# Patient Record
Sex: FEMALE | Race: WHITE
[De-identification: names, ages, dates, MRNs, and addresses within clinical notes are randomized per-mention and may not be internally consistent; named-entity substitution may affect disease eponyms.]

---

## 2018-10-11 ENCOUNTER — HOSPITAL ENCOUNTER (EMERGENCY)
Dept: HOSPITAL 74 - FER | Age: 23
Discharge: HOME | End: 2018-10-11
Payer: COMMERCIAL

## 2018-10-11 VITALS — HEART RATE: 110 BPM | DIASTOLIC BLOOD PRESSURE: 76 MMHG | TEMPERATURE: 98.5 F | SYSTOLIC BLOOD PRESSURE: 119 MMHG

## 2018-10-11 VITALS — BODY MASS INDEX: 17.9 KG/M2

## 2018-10-11 DIAGNOSIS — K58.9: ICD-10-CM

## 2018-10-11 DIAGNOSIS — R19.7: Primary | ICD-10-CM

## 2018-10-11 LAB
ALBUMIN SERPL-MCNC: 4 G/DL (ref 3.5–5)
ALP SERPL-CCNC: 32 U/L (ref 32–92)
ALT SERPL-CCNC: 12 U/L (ref 10–40)
ANION GAP SERPL CALC-SCNC: 8 MMOL/L (ref 8–16)
AST SERPL-CCNC: 20 U/L (ref 10–42)
BACTERIA #/AREA URNS HPF: (no result) /HPF
BASOPHILS # BLD: 0.2 % (ref 0–2)
BILIRUB SERPL-MCNC: 0.9 MG/DL (ref 0.2–1)
BUN SERPL-MCNC: 5 MG/DL (ref 7–18)
CALCIUM SERPL-MCNC: 8.7 MG/DL (ref 8.4–10.2)
CHLORIDE SERPL-SCNC: 100 MMOL/L (ref 98–107)
CO2 SERPL-SCNC: 23 MMOL/L (ref 22–28)
CREAT SERPL-MCNC: 0.6 MG/DL (ref 0.6–1.3)
DEPRECATED RDW RBC AUTO: 12.4 % (ref 11.6–15.6)
EOSINOPHIL # BLD: 0 % (ref 0–4.5)
EPITH CASTS URNS QL MICRO: (no result) /HPF
GLUCOSE SERPL-MCNC: 97 MG/DL (ref 74–106)
HCT VFR BLD CALC: 37.1 % (ref 32.4–45.2)
HGB BLD-MCNC: 12.9 GM/DL (ref 10.7–15.3)
KETONES UR QL STRIP: (no result)
LEUKOCYTE ESTERASE UR QL STRIP.AUTO: (no result)
LYMPHOCYTES # BLD: 8 % (ref 8–40)
MCH RBC QN AUTO: 29.3 PG (ref 25.7–33.7)
MCHC RBC AUTO-ENTMCNC: 34.7 G/DL (ref 32–36)
MCV RBC: 84.4 FL (ref 80–96)
MONOCYTES # BLD AUTO: 4.9 % (ref 3.8–10.2)
NEUTROPHILS # BLD: 86.9 % (ref 42.8–82.8)
PH UR: 6 [PH] (ref 4.5–8)
PLATELET # BLD AUTO: 240 K/MM3 (ref 134–434)
PMV BLD: 8.6 FL (ref 7.5–11.1)
POTASSIUM SERPLBLD-SCNC: 3.4 MMOL/L (ref 3.5–5.1)
PROT SERPL-MCNC: 7.4 G/DL (ref 6.4–8.3)
RBC # BLD AUTO: (no result) /HPF (ref 0–3)
RBC # BLD AUTO: 4.39 M/MM3 (ref 3.6–5.2)
SODIUM SERPL-SCNC: 131 MMOL/L (ref 136–145)
SP GR UR: <= 1.005 (ref 1.01–1.03)
UROBILINOGEN UR STRIP-MCNC: 0.2 MG/DL (ref 0.2–1)
WBC # BLD AUTO: 10.7 K/MM3 (ref 4–10.8)

## 2018-10-11 PROCEDURE — 3E033GC INTRODUCTION OF OTHER THERAPEUTIC SUBSTANCE INTO PERIPHERAL VEIN, PERCUTANEOUS APPROACH: ICD-10-PCS

## 2018-10-11 NOTE — PDOC
History of Present Illness





- General


History Source: Patient, Family


Exam Limitations: No Limitations





- History of Present Illness


Initial Comments: 





10/11/18 22:26





The patient is a 23 year old female, with a significant past medical history of 

IBS, who presents to the ED complaining of abdominal pain, fever and diarrhea 

for the past 2 days. She describes her diarrhea as orange colored but when she 

was here in the ED she noticed some blood in the stool. She went to an urgent 

care today who advised her to come to the ED for further evaluation. She 

reports a total of 10 bowel movements. She notes that her fever has been as 

high as 103 F. She reports that she had reheated Chinese food 2 days ago prior 

to her symptoms starting. She notes that her family ate the chinese food the 

day prior with no symptoms. She denies any recent travel other than a trip to 

[place] 1 month ago. 





The patient denies chest pain, shortness of breath, headache and dizziness. 

Denies chills, vomiting, or constipation. Denies dysuria, frequency, urgency 

and hematuria. 





Allergies: none


Past surgical history: none reported 


Social History: No alcohol, tobacco or drug use reported 








<Julián Negrete - Last Filed: 10/11/18 23:26>





<Laura Oliva - Last Filed: 10/12/18 04:43>





- General


Chief Complaint: Diarrhea


Stated Complaint: FEVER/DIARRHEA


Time Seen by Provider: 10/11/18 20:12





Past History





<Julián Negrete - Last Filed: 10/11/18 23:26>





- Past Medical History


COPD: No


GI Disorders: Yes (IBS)


Other medical history: CHRONIC BACK/LEG PAIN





- Suicide/Smoking/Psychosocial Hx


Smoking History: Never smoked





<Laura Oliva - Last Filed: 10/12/18 04:43>





- Past Medical History


Allergies/Adverse Reactions: 


 Allergies











Allergy/AdvReac Type Severity Reaction Status Date / Time


 


No Known Allergies Allergy   Unverified 10/11/18 19:59











Home Medications: 


Ambulatory Orders





Azithromycin [Zithromax Tri-Moose (3 DAYS) -] 500 mg PO DAILY #3 tablet 10/11/18 


Ibuprofen [Advil -] 400 mg PO ONCE 10/11/18 











**Review of Systems





- Review of Systems


Able to Perform ROS?: Yes


Comments:: 





10/11/18 22:26





GENERAL/CONSTITUTIONAL: (+) Fever. No chills. No weakness.


HEAD, EYES, EARS, NOSE AND THROAT: No change in vision. No ear pain or 

discharge. No sore throat.


GASTROINTESTINAL: (+) Abdominal pain and diarrhea. No nausea, vomiting, or 

constipation.


GENITOURINARY: No dysuria, frequency, or change in urination.


CARDIOVASCULAR: No chest pain or shortness of breath.


RESPIRATORY: No cough, wheezing, or hemoptysis.


MUSCULOSKELETAL: No joint or muscle swelling or pain. No neck or back pain.


SKIN: No rash


NEUROLOGIC: No headache, vertigo, loss of consciousness, or change in strength/

sensation.


ENDOCRINE: No increased thirst. No abnormal weight change.


HEMATOLOGIC/LYMPHATIC: No anemia, easy bleeding, or history of blood clots.


ALLERGIC/IMMUNOLOGIC: No hives or skin allergy.








<Julián Negrete - Last Filed: 10/11/18 23:26>





*Physical Exam





- Vital Signs


 Last Vital Signs











Temp Pulse Resp BP Pulse Ox


 


 98.5 F   110 H  16   119/76   100 


 


 10/11/18 21:43  10/11/18 21:43  10/11/18 21:43  10/11/18 21:43  10/11/18 21:43














- Physical Exam


Comments: 





10/11/18 22:26


Constitutional: Awake, alert, oriented.  No acute distress.


Head:  Normocephalic.  Atraumatic


Eyes:  PERRL. EOMI.  Conjunctivae are not pale.


ENT:  (+) Dry Mucous Membrane . Posterior pharynx without exudates or erythema. 

Uvula midline.


Neck:  Supple.  Full ROM. No lymphadenopathy.


Cardiovascular:  Regular rate.  Regular rhythm. S1, S2 regular.  Distal pulses 

are 2+ and symmetric.  


Pulmonary/Chest:  No evidence of respiratory distress.  Clear to auscultation 

bilaterally  No wheezing, rales or rhonchi.


Abdominal:  (+) Left lower quadrant tenderness. Soft and non-distended.  No 

rebound, guarding or rigidity.  No organomegaly. No palpable masses. Good bowel 

sounds.


Back:  No CVA tenderness.


Musculoskeletal:  No edema.  No cyanosis.  No clubbing.  Full range of motion 

in all extremities.  Nocalf tenderness. Radial/pedal pulses are intact and 2+ 

bilaterally


Skin:  Skin is warm and dry.  No petechiae.  No purpura.  


Neurological:  Alert and oriented to person, place, and time.  Cranial nerves II

-XII are grossly intact. Normal speech. Strength is grossly symmetric. No 

sensory deficits.


Psychiatric:  Good eye contact.  Normal interaction, affect and behavior.








<Julián Negrete - Last Filed: 10/11/18 23:26>





- Vital Signs


 Last Vital Signs











Temp Pulse Resp BP Pulse Ox


 


 98.5 F   110 H  16   119/76   100 


 


 10/11/18 21:43  10/11/18 21:43  10/11/18 21:43  10/11/18 21:43  10/11/18 21:43














<PjKenishaLaura J - Last Filed: 10/12/18 04:43>





ED Treatment Course





- LABORATORY


CBC & Chemistry Diagram: 


 10/11/18 20:39





 10/11/18 20:39





- ADDITIONAL ORDERS


Additional order review: 


 Laboratory  Results











  10/11/18 10/11/18





  21:17 20:39


 


Sodium   131 L


 


Potassium   3.4 L


 


Chloride   100


 


Carbon Dioxide   23


 


Anion Gap   8


 


BUN   5 L


 


Creatinine   0.6


 


Creat Clearance w eGFR   > 60


 


Random Glucose   97


 


Calcium   8.7


 


Total Bilirubin   0.9


 


AST   20


 


ALT   12


 


Alkaline Phosphatase   32


 


Total Protein   7.4


 


Albumin   4.0


 


Urine Color  Yellow 


 


Urine Appearance  Clear 


 


Urine pH  6.0 


 


Ur Specific Gravity  <= 1.005 L 


 


Urine Protein  Negative 


 


Urine Glucose (UA)  Negative 


 


Urine Ketones  1+ H 


 


Urine Blood  Trace-intact H 


 


Urine Nitrite  Negative 


 


Urine Bilirubin  Negative 


 


Urine Urobilinogen  0.2 


 


Ur Leukocyte Esterase  3+ H 


 


Urine RBC  2-5 


 


Urine WBC  10-20 


 


Ur Epithelial Cells  Few 


 


Urine Bacteria  3+ 


 


Urine HCG, Qual  Negative 








 











  10/11/18





  20:39


 


RBC  4.39


 


MCV  84.4


 


MCHC  34.7


 


RDW  12.4


 


MPV  8.6


 


Neutrophils %  86.9 H


 


Lymphocytes %  8.0


 


Monocytes %  4.9


 


Eosinophils %  0.0


 


Basophils %  0.2














<Julián Negrete - Last Filed: 10/11/18 23:26>





- LABORATORY


CBC & Chemistry Diagram: 


 10/11/18 20:39





 10/11/18 20:39





- ADDITIONAL ORDERS


Additional order review: 


 Laboratory  Results











  10/11/18 10/11/18





  21:17 20:39


 


Sodium   131 L


 


Potassium   3.4 L


 


Chloride   100


 


Carbon Dioxide   23


 


Anion Gap   8


 


BUN   5 L


 


Creatinine   0.6


 


Creat Clearance w eGFR   > 60


 


Random Glucose   97


 


Calcium   8.7


 


Total Bilirubin   0.9


 


AST   20


 


ALT   12


 


Alkaline Phosphatase   32


 


Total Protein   7.4


 


Albumin   4.0


 


Urine Color  Yellow 


 


Urine Appearance  Clear 


 


Urine pH  6.0 


 


Ur Specific Gravity  <= 1.005 L 


 


Urine Protein  Negative 


 


Urine Glucose (UA)  Negative 


 


Urine Ketones  1+ H 


 


Urine Blood  Trace-intact H 


 


Urine Nitrite  Negative 


 


Urine Bilirubin  Negative 


 


Urine Urobilinogen  0.2 


 


Ur Leukocyte Esterase  3+ H 


 


Urine RBC  2-5 


 


Urine WBC  10-20 


 


Ur Epithelial Cells  Few 


 


Urine Bacteria  3+ 


 


Urine HCG, Qual  Negative 








 











  10/11/18





  20:39


 


RBC  4.39


 


MCV  84.4


 


MCHC  34.7


 


RDW  12.4


 


MPV  8.6


 


Neutrophils %  86.9 H


 


Lymphocytes %  8.0


 


Monocytes %  4.9


 


Eosinophils %  0.0


 


Basophils %  0.2














<Laura Oliva - Last Filed: 10/12/18 04:43>





Progress Note





- Progress Note


Progress Note: 





Documentation has been prepared under my direction and personally reviewed by 

me in its entirety. I attest that this documented accurately reflects all work, 

treatment, procedures and medical decision making performed by me.





<Laura Oliva - Last Filed: 10/12/18 04:43>





Medical Decision Making





- Medical Decision Making





As noted above, this 23-year-old woman with a history of irritable bowel 

syndrome but no other significant abnormalities presents with a few day history 

of diarrhea without nausea/vomiting.  This occurred after eating reheated 

Chinese food.  No recent travel or exposure to other people with diarrhea.  Of 

note, over the last few hours, patient has developed maria blood in her 

diarrhea.  Exam as noted.


Patient received normal saline IV hydration, with CBC/chemistry profile/UA/PGU 

obtained.  Results not significantly abnormal except for potassium of 3.4(10 

mEq KCl IV replacement).  Renal function as well as remainder of the 

electrolytes are essentially normal.  CBC showed a mild increase in neutrophils 

but a normal WBC count.  Urinalysis was consistent with UTI (although patient 

denied any symptoms of UTI) with 3+ bacteria/few epi/3+ LE/10-20 WBC.  Urine C&

S sent.





Because of the patient's maria blood in the stool over the last day, patient 

will be treated with azithromycin 500 mg today and for the next 2 days, first 

dose given here in the ER.


Patient should rest, drink plenty of fluids and continue light diet.  She 

should return to the emergency room if she has worsening pain, develops high 

fever or vomiting.  She should plan to follow up with her general doctor (or 

gastroenterologist) within the next few days.








<Laura Oliva - Last Filed: 10/12/18 04:43>





*DC/Admit/Observation/Transfer





- Attestations


Scribe Attestion: 





10/11/18 22:26





Documentation prepared by Julián Negrete, acting as medical scribe for Laura Oliva MD





<Julián Negrete - Last Filed: 10/11/18 23:26>





<Laura Olvia - Last Filed: 10/12/18 04:43>


Diagnosis at time of Disposition: 


Diarrhea


Qualifiers:


 Diarrhea type: unspecified type Qualified Code(s): R19.7 - Diarrhea, 

unspecified








- Discharge Dispostion


Disposition: HOME


Condition at time of disposition: Stable





- Prescriptions


Prescriptions: 


Azithromycin [Zithromax Tri-Moose (3 DAYS) -] 500 mg PO DAILY #3 tablet





- Referrals


Referrals: 


Jensen Genao MD [Primary Care Provider] - 





- Patient Instructions


Printed Discharge Instructions:  Diarrhea


Additional Instructions: 


rest 


drink plenty of fluids


Azirhromycin 500mg daily for the next 3 days


 no school until Wednesday, Oct 17th


followup with your doctor if pain/bloody stool persists


return to ER if symptoms are severe





- Post Discharge Activity


Forms/Work/School Notes:  Back to School

## 2019-11-25 ENCOUNTER — RECORD ABSTRACTING (OUTPATIENT)
Age: 24
End: 2019-11-25

## 2019-11-25 DIAGNOSIS — M54.42 LUMBAGO WITH SCIATICA, LEFT SIDE: ICD-10-CM

## 2019-11-25 DIAGNOSIS — L70.9 ACNE, UNSPECIFIED: ICD-10-CM

## 2019-12-13 ENCOUNTER — APPOINTMENT (OUTPATIENT)
Dept: INTERNAL MEDICINE | Facility: CLINIC | Age: 24
End: 2019-12-13
Payer: COMMERCIAL

## 2019-12-13 VITALS — TEMPERATURE: 98 F | BODY MASS INDEX: 17.75 KG/M2 | HEIGHT: 61 IN | WEIGHT: 94 LBS

## 2019-12-13 VITALS — DIASTOLIC BLOOD PRESSURE: 70 MMHG | SYSTOLIC BLOOD PRESSURE: 120 MMHG

## 2019-12-13 DIAGNOSIS — Z23 ENCOUNTER FOR IMMUNIZATION: ICD-10-CM

## 2019-12-13 DIAGNOSIS — Z72.3 LACK OF PHYSICAL EXERCISE: ICD-10-CM

## 2019-12-13 DIAGNOSIS — Z82.49 FAMILY HISTORY OF ISCHEMIC HEART DISEASE AND OTHER DISEASES OF THE CIRCULATORY SYSTEM: ICD-10-CM

## 2019-12-13 DIAGNOSIS — Z00.00 ENCOUNTER FOR GENERAL ADULT MEDICAL EXAMINATION W/OUT ABNORMAL FINDINGS: ICD-10-CM

## 2019-12-13 PROCEDURE — 36415 COLL VENOUS BLD VENIPUNCTURE: CPT

## 2019-12-13 PROCEDURE — G0008: CPT

## 2019-12-13 PROCEDURE — 90686 IIV4 VACC NO PRSV 0.5 ML IM: CPT

## 2019-12-13 PROCEDURE — 99395 PREV VISIT EST AGE 18-39: CPT | Mod: 25

## 2019-12-13 RX ORDER — IBUPROFEN 200 MG/1
200 TABLET, COATED ORAL
Refills: 0 | Status: COMPLETED | COMMUNITY
End: 2019-12-13

## 2019-12-13 RX ORDER — NAPROXEN 500 MG/1
500 TABLET ORAL
Refills: 0 | Status: COMPLETED | COMMUNITY
End: 2019-12-13

## 2019-12-13 RX ORDER — CYCLOBENZAPRINE HYDROCHLORIDE 10 MG/1
10 TABLET, FILM COATED ORAL
Refills: 0 | Status: COMPLETED | COMMUNITY
End: 2019-12-13

## 2019-12-13 NOTE — HISTORY OF PRESENT ILLNESS
[FreeTextEntry1] : physical [de-identified] : Patient is a 24F with a hx of lumbar disc herniation, IBS-D presents today for annual physical. \par Feeling well overall. 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 Will be graduating in May, working part time. \par Was diagnosed with IBS-D ([id number] during college). No really a full workup (no labs or stool testing. Has had 2 episodes of bloody diarrhea in the last few years. Was following with Dr. Tai Bennett of [facility] who did colonoscopy in 2015 which was normal. In October 2018, went to hospital with bloody diarrhea (was given 2 antibiotics) and since then sympromts have been improved but not gone. \par Not exercising due to lumbar radiculopathy

## 2019-12-13 NOTE — HEALTH RISK ASSESSMENT
[Yes] : Yes [2 - 4 times a month (2 pts)] : 2-4 times a month (2 points) [1 or 2 (0 pts)] : 1 or 2 (0 points) [Never (0 pts)] : Never (0 points) [No] : In the past 12 months have you used drugs other than those required for medical reasons? No [No falls in past year] : Patient reported no falls in the past year [0] : 2) Feeling down, depressed, or hopeless: Not at all (0) [Patient reported colonoscopy was normal] : Patient reported colonoscopy was normal [HIV Test offered] : HIV Test offered [Hepatitis C test offered] : Hepatitis C test offered [With Family] : lives with family [# of Members in Household ___] :  household currently consist of [unfilled] member(s) [Employed] : employed [Graduate School] : graduate school [Student] : student [# Of Children ___] : has [unfilled] children [Single] : single [Feels Safe at Home] : Feels safe at home [Reports normal functional visual acuity (ie: able to read med bottle)] : Reports normal functional visual acuity [Reports changes in dental health] : Reports changes in dental health [Smoke Detector] : smoke detector [Carbon Monoxide Detector] : carbon monoxide detector [Sunscreen] : uses sunscreen [Seat Belt] :  uses seat belt [Patient/Caregiver not ready to engage] : Patient/Caregiver not ready to engage [] : No [de-identified] : socially  [Audit-CScore] : 2 [de-identified] : No, Pt. has a back condition.  [de-identified] : Well balanced  [OTN0Rzulp] : 0 [Sexually Active] : not sexually active [Reports changes in hearing] : Reports no changes in hearing [Reports changes in vision] : Reports no changes in vision [Guns at Home] : no guns at home [Caregiver Concerns] : does not have caregiver concerns [Travel to Developing Areas] : does not  travel to developing areas [Safety elements used in home] : no safety elements used in home [ColonoscopyDate] : [id number] [de-identified] : Full-time grad student  [FreeTextEntry2] : [employer]  [de-identified] : last exam 1/2019 [de-identified] : poor gums, last exam 9/2019 [AdvancecareDate] : 12/2019

## 2019-12-13 NOTE — PHYSICAL EXAM
[No Acute Distress] : no acute distress [PERRL] : pupils equal round and reactive to light [Normal Sclera/Conjunctiva] : normal sclera/conjunctiva [EOMI] : extraocular movements intact [Normal Outer Ear/Nose] : the outer ears and nose were normal in appearance [Normal Oropharynx] : the oropharynx was normal [Supple] : supple [No Lymphadenopathy] : no lymphadenopathy [No Respiratory Distress] : no respiratory distress  [Thyroid Normal, No Nodules] : the thyroid was normal and there were no nodules present [Clear to Auscultation] : lungs were clear to auscultation bilaterally [No Accessory Muscle Use] : no accessory muscle use [Normal Rate] : normal rate  [Regular Rhythm] : with a regular rhythm [No Murmur] : no murmur heard [Normal S1, S2] : normal S1 and S2 [No Varicosities] : no varicosities [No Palpable Aorta] : no palpable aorta [Pedal Pulses Present] : the pedal pulses are present [No Edema] : there was no peripheral edema [No Extremity Clubbing/Cyanosis] : no extremity clubbing/cyanosis [Non Tender] : non-tender [Soft] : abdomen soft [No HSM] : no HSM [No Masses] : no abdominal mass palpated [Non-distended] : non-distended [Normal Bowel Sounds] : normal bowel sounds [Normal Posterior Cervical Nodes] : no posterior cervical lymphadenopathy [Normal Anterior Cervical Nodes] : no anterior cervical lymphadenopathy [No Joint Swelling] : no joint swelling [Grossly Normal Strength/Tone] : grossly normal strength/tone [No Rash] : no rash [Coordination Grossly Intact] : coordination grossly intact [Normal Gait] : normal gait [No Focal Deficits] : no focal deficits [Normal Insight/Judgement] : insight and judgment were intact [Normal Affect] : the affect was normal

## 2019-12-19 LAB
25(OH)D3 SERPL-MCNC: 19.1 NG/ML
ALBUMIN SERPL ELPH-MCNC: 4.8 G/DL
ALP BLD-CCNC: 43 U/L
ALT SERPL-CCNC: 10 U/L
ANION GAP SERPL CALC-SCNC: 12 MMOL/L
AST SERPL-CCNC: 16 U/L
BASOPHILS # BLD AUTO: 0.04 K/UL
BASOPHILS NFR BLD AUTO: 0.7 %
BILIRUB SERPL-MCNC: 1 MG/DL
BUN SERPL-MCNC: 10 MG/DL
CALCIUM SERPL-MCNC: 9.7 MG/DL
CHLORIDE SERPL-SCNC: 103 MMOL/L
CHOLEST SERPL-MCNC: 152 MG/DL
CHOLEST/HDLC SERPL: 2 RATIO
CO2 SERPL-SCNC: 24 MMOL/L
CREAT SERPL-MCNC: 0.53 MG/DL
EOSINOPHIL # BLD AUTO: 0.1 K/UL
EOSINOPHIL NFR BLD AUTO: 1.7 %
GLUCOSE SERPL-MCNC: 83 MG/DL
HCT VFR BLD CALC: 38.2 %
HCV AB SER QL: NONREACTIVE
HCV S/CO RATIO: 0.12 S/CO
HDLC SERPL-MCNC: 75 MG/DL
HGB BLD-MCNC: 12.2 G/DL
HIV1+2 AB SPEC QL IA.RAPID: NONREACTIVE
IMM GRANULOCYTES NFR BLD AUTO: 0.3 %
LDLC SERPL CALC-MCNC: 69 MG/DL
LYMPHOCYTES # BLD AUTO: 1.95 K/UL
LYMPHOCYTES NFR BLD AUTO: 32.6 %
MAN DIFF?: NORMAL
MCHC RBC-ENTMCNC: 28.7 PG
MCHC RBC-ENTMCNC: 31.9 GM/DL
MCV RBC AUTO: 89.9 FL
MONOCYTES # BLD AUTO: 0.42 K/UL
MONOCYTES NFR BLD AUTO: 7 %
NEUTROPHILS # BLD AUTO: 3.45 K/UL
NEUTROPHILS NFR BLD AUTO: 57.7 %
PLATELET # BLD AUTO: 321 K/UL
POTASSIUM SERPL-SCNC: 3.8 MMOL/L
PROT SERPL-MCNC: 7.5 G/DL
RBC # BLD: 4.25 M/UL
RBC # FLD: 13.1 %
SODIUM SERPL-SCNC: 139 MMOL/L
TRIGL SERPL-MCNC: 38 MG/DL
TSH SERPL-ACNC: 1.78 UIU/ML
VIT B12 SERPL-MCNC: 433 PG/ML
WBC # FLD AUTO: 5.98 K/UL

## 2020-06-10 ENCOUNTER — APPOINTMENT (OUTPATIENT)
Dept: INTERNAL MEDICINE | Facility: CLINIC | Age: 25
End: 2020-06-10
Payer: COMMERCIAL

## 2020-06-10 VITALS
HEIGHT: 61 IN | BODY MASS INDEX: 18.5 KG/M2 | WEIGHT: 98 LBS | DIASTOLIC BLOOD PRESSURE: 60 MMHG | SYSTOLIC BLOOD PRESSURE: 104 MMHG | OXYGEN SATURATION: 98 % | HEART RATE: 72 BPM

## 2020-06-10 DIAGNOSIS — Z20.828 CONTACT WITH AND (SUSPECTED) EXPOSURE TO OTHER VIRAL COMMUNICABLE DISEASES: ICD-10-CM

## 2020-06-10 DIAGNOSIS — Z23 ENCOUNTER FOR IMMUNIZATION: ICD-10-CM

## 2020-06-10 DIAGNOSIS — Z11.1 ENCOUNTER FOR SCREENING FOR RESPIRATORY TUBERCULOSIS: ICD-10-CM

## 2020-06-10 PROCEDURE — 90715 TDAP VACCINE 7 YRS/> IM: CPT

## 2020-06-10 PROCEDURE — G0442 ANNUAL ALCOHOL SCREEN 15 MIN: CPT | Mod: 59

## 2020-06-10 PROCEDURE — 90471 IMMUNIZATION ADMIN: CPT

## 2020-06-10 PROCEDURE — 86580 TB INTRADERMAL TEST: CPT

## 2020-06-10 PROCEDURE — 99214 OFFICE O/P EST MOD 30 MIN: CPT | Mod: 25

## 2020-06-10 PROCEDURE — 36415 COLL VENOUS BLD VENIPUNCTURE: CPT

## 2020-06-11 PROBLEM — Z20.828 EXPOSURE TO COVID-19 VIRUS: Status: ACTIVE | Noted: 2020-06-10

## 2020-06-11 PROBLEM — Z23 NEED FOR MMR VACCINE: Status: ACTIVE | Noted: 2020-06-10

## 2020-06-12 ENCOUNTER — APPOINTMENT (OUTPATIENT)
Dept: INTERNAL MEDICINE | Facility: CLINIC | Age: 25
End: 2020-06-12
Payer: COMMERCIAL

## 2020-06-12 PROCEDURE — 90716 VAR VACCINE LIVE SUBQ: CPT

## 2020-06-12 PROCEDURE — 90471 IMMUNIZATION ADMIN: CPT

## 2020-06-17 LAB
HBV SURFACE AB SER QL: NONREACTIVE
HBV SURFACE AG SER QL: NONREACTIVE
MEV IGG FLD QL IA: 77.2 AU/ML
MEV IGG+IGM SER-IMP: POSITIVE
MUV AB SER-ACNC: POSITIVE
MUV IGG SER QL IA: 67.3 AU/ML
RUBV IGG FLD-ACNC: 1.4 INDEX
RUBV IGG SER-IMP: POSITIVE
SARS-COV-2 IGG SERPL IA-ACNC: <3.8 AU/ML
SARS-COV-2 IGG SERPL QL IA: NEGATIVE
VZV AB TITR SER: NEGATIVE
VZV IGG SER IF-ACNC: 132.3 INDEX

## 2020-07-01 ENCOUNTER — APPOINTMENT (OUTPATIENT)
Dept: INTERNAL MEDICINE | Facility: CLINIC | Age: 25
End: 2020-07-01
Payer: COMMERCIAL

## 2020-07-01 VITALS
SYSTOLIC BLOOD PRESSURE: 100 MMHG | WEIGHT: 98 LBS | HEART RATE: 74 BPM | BODY MASS INDEX: 18.5 KG/M2 | OXYGEN SATURATION: 98 % | HEIGHT: 61 IN | DIASTOLIC BLOOD PRESSURE: 60 MMHG

## 2020-07-01 DIAGNOSIS — Z20.820 CONTACT WITH AND (SUSPECTED) EXPOSURE TO VARICELLA: ICD-10-CM

## 2020-07-01 DIAGNOSIS — Z11.59 ENCOUNTER FOR SCREENING FOR OTHER VIRAL DISEASES: ICD-10-CM

## 2020-07-01 PROCEDURE — 99213 OFFICE O/P EST LOW 20 MIN: CPT

## 2020-07-02 PROBLEM — Z20.820 VARICELLA CONTACT: Status: ACTIVE | Noted: 2020-06-10

## 2020-07-02 PROBLEM — Z11.59 NEED FOR HEPATITIS B SCREENING TEST: Status: ACTIVE | Noted: 2020-06-10

## 2020-07-02 NOTE — HISTORY OF PRESENT ILLNESS
[de-identified] : Patient needs varicela titers and a Hep B vaccine [FreeTextEntry1] : varceilla titers\par \par hep b

## 2020-08-11 LAB
VZV AB TITR SER: POSITIVE
VZV IGG SER IF-ACNC: 1412 INDEX

## 2020-12-23 PROBLEM — Z11.1 ENCOUNTER FOR PPD SKIN TEST READING: Status: RESOLVED | Noted: 2020-06-10 | Resolved: 2020-12-23

## 2021-04-02 ENCOUNTER — TRANSCRIPTION ENCOUNTER (OUTPATIENT)
Age: 26
End: 2021-04-02

## 2021-05-24 ENCOUNTER — NON-APPOINTMENT (OUTPATIENT)
Age: 26
End: 2021-05-24

## 2021-05-26 ENCOUNTER — APPOINTMENT (OUTPATIENT)
Dept: OBGYN | Facility: CLINIC | Age: 26
End: 2021-05-26
Payer: COMMERCIAL

## 2021-05-26 VITALS
HEIGHT: 61 IN | BODY MASS INDEX: 17.94 KG/M2 | DIASTOLIC BLOOD PRESSURE: 60 MMHG | WEIGHT: 95 LBS | SYSTOLIC BLOOD PRESSURE: 100 MMHG

## 2021-05-26 DIAGNOSIS — Z87.19 PERSONAL HISTORY OF OTHER DISEASES OF THE DIGESTIVE SYSTEM: ICD-10-CM

## 2021-05-26 DIAGNOSIS — Z11.3 ENCOUNTER FOR SCREENING FOR INFECTIONS WITH A PREDOMINANTLY SEXUAL MODE OF TRANSMISSION: ICD-10-CM

## 2021-05-26 DIAGNOSIS — Z01.419 ENCOUNTER FOR GYNECOLOGICAL EXAMINATION (GENERAL) (ROUTINE) W/OUT ABNORMAL FINDINGS: ICD-10-CM

## 2021-05-26 PROCEDURE — 99385 PREV VISIT NEW AGE 18-39: CPT

## 2021-05-26 PROCEDURE — 99072 ADDL SUPL MATRL&STAF TM PHE: CPT

## 2021-05-27 LAB
HBV SURFACE AG SER QL: NONREACTIVE
HCV AB SER QL: NONREACTIVE
HCV S/CO RATIO: 0.14 S/CO
HIV1+2 AB SPEC QL IA.RAPID: NONREACTIVE
T PALLIDUM AB SER QL IA: NEGATIVE

## 2021-11-12 NOTE — PHYSICAL EXAM
[No Acute Distress] : no acute distress [Well Nourished] : well nourished [Well Developed] : well developed [Well-Appearing] : well-appearing [EOMI] : extraocular movements intact [No JVD] : no jugular venous distention [Thyroid Normal, No Nodules] : the thyroid was normal and there were no nodules present [Normal Rate] : normal rate  [No Edema] : there was no peripheral edema [Soft] : abdomen soft [Non Tender] : non-tender [No Masses] : no abdominal mass palpated [No HSM] : no HSM [Non-distended] : non-distended [Normal Bowel Sounds] : normal bowel sounds [Normal Posterior Cervical Nodes] : no posterior cervical lymphadenopathy [Normal Anterior Cervical Nodes] : no anterior cervical lymphadenopathy [No CVA Tenderness] : no CVA  tenderness [No Spinal Tenderness] : no spinal tenderness [No Rash] : no rash [Normal Gait] : normal gait [Deep Tendon Reflexes (DTR)] : deep tendon reflexes were 2+ and symmetric [No Focal Deficits] : no focal deficits [Normal Affect] : the affect was normal [Normal Insight/Judgement] : insight and judgment were intact No

## 2023-01-17 ENCOUNTER — APPOINTMENT (OUTPATIENT)
Dept: INTERNAL MEDICINE | Facility: CLINIC | Age: 28
End: 2023-01-17
Payer: COMMERCIAL

## 2023-01-17 ENCOUNTER — LABORATORY RESULT (OUTPATIENT)
Age: 28
End: 2023-01-17

## 2023-01-17 VITALS
SYSTOLIC BLOOD PRESSURE: 110 MMHG | HEIGHT: 61 IN | WEIGHT: 98 LBS | BODY MASS INDEX: 18.5 KG/M2 | HEART RATE: 81 BPM | OXYGEN SATURATION: 95 % | DIASTOLIC BLOOD PRESSURE: 80 MMHG

## 2023-01-17 DIAGNOSIS — N94.10 UNSPECIFIED DYSPAREUNIA: ICD-10-CM

## 2023-01-17 DIAGNOSIS — Z72.89 OTHER PROBLEMS RELATED TO LIFESTYLE: ICD-10-CM

## 2023-01-17 DIAGNOSIS — K62.5 HEMORRHAGE OF ANUS AND RECTUM: ICD-10-CM

## 2023-01-17 DIAGNOSIS — Z80.3 FAMILY HISTORY OF MALIGNANT NEOPLASM OF BREAST: ICD-10-CM

## 2023-01-17 DIAGNOSIS — Z00.00 ENCOUNTER FOR GENERAL ADULT MEDICAL EXAMINATION W/OUT ABNORMAL FINDINGS: ICD-10-CM

## 2023-01-17 PROCEDURE — 99213 OFFICE O/P EST LOW 20 MIN: CPT | Mod: 25

## 2023-01-17 PROCEDURE — 99395 PREV VISIT EST AGE 18-39: CPT | Mod: 25

## 2023-01-17 NOTE — REVIEW OF SYSTEMS
[Fever] : no fever [Recent Change In Weight] : ~T no recent weight change [Nasal Discharge] : no nasal discharge [Chest Pain] : no chest pain [Palpitations] : no palpitations [Shortness Of Breath] : no shortness of breath [Wheezing] : no wheezing [Cough] : no cough [Abdominal Pain] : no abdominal pain [Nausea] : no nausea [Constipation] : no constipation [Diarrhea] : diarrhea [Vomiting] : no vomiting [Heartburn] : no heartburn [Melena] : no melena [Dysuria] : no dysuria [Hematuria] : no hematuria [Frequency] : no frequency [FreeTextEntry7] : Normal blood in the stool recently

## 2023-01-17 NOTE — HEALTH RISK ASSESSMENT
[Never] : Never [Yes] : Yes [Monthly or less (1 pt)] : Monthly or less (1 point) [1 or 2 (0 pts)] : 1 or 2 (0 points) [Never (0 pts)] : Never (0 points) [0] : 2) Feeling down, depressed, or hopeless: Not at all (0) [PHQ-2 Negative - No further assessment needed] : PHQ-2 Negative - No further assessment needed [PHQ-9 Negative - No further assessment needed] : PHQ-9 Negative - No further assessment needed [Audit-CScore] : 1 [[id number]] : 0 [ColonoscopyComments] : patient stated shes getting a colonoscopy on 1/20/2023

## 2023-01-17 NOTE — HISTORY OF PRESENT ILLNESS
[FreeTextEntry1] : Here for annual PE. \par Went to ED for painless rectal bleed 12/11. Had normal BMs. \par LMP 12/13 Not sexually active with intercourse. Gets menses every 4-5 weeks.

## 2023-01-17 NOTE — PHYSICAL EXAM
[No Acute Distress] : no acute distress [Normal Voice/Communication] : normal voice/communication [Normal Sclera/Conjunctiva] : normal sclera/conjunctiva [No Lymphadenopathy] : no lymphadenopathy [No Edema] : there was no peripheral edema [Normal Appearance] : normal in appearance [No Nipple Discharge] : no nipple discharge [No Axillary Lymphadenopathy] : no axillary lymphadenopathy [Normal] : soft, non-tender, non-distended, no masses palpated, no HSM and normal bowel sounds [de-identified] : Mobile approximately 4 x 3 cm left breast mass upper outer quadrant.  No tenderness, skin changes, warmth or erythema

## 2023-01-18 LAB
ALBUMIN SERPL ELPH-MCNC: 4.7 G/DL
ALP BLD-CCNC: 42 U/L
ALT SERPL-CCNC: 12 U/L
ANION GAP SERPL CALC-SCNC: 13 MMOL/L
AST SERPL-CCNC: 14 U/L
BASOPHILS # BLD AUTO: 0.03 K/UL
BASOPHILS NFR BLD AUTO: 0.4 %
BILIRUB SERPL-MCNC: 0.9 MG/DL
BUN SERPL-MCNC: 9 MG/DL
CALCIUM SERPL-MCNC: 9.9 MG/DL
CHLORIDE SERPL-SCNC: 101 MMOL/L
CHOLEST SERPL-MCNC: 168 MG/DL
CO2 SERPL-SCNC: 24 MMOL/L
CREAT SERPL-MCNC: 0.52 MG/DL
EGFR: 131 ML/MIN/1.73M2
EOSINOPHIL # BLD AUTO: 0.08 K/UL
EOSINOPHIL NFR BLD AUTO: 1 %
ESTIMATED AVERAGE GLUCOSE: 94 MG/DL
FERRITIN SERPL-MCNC: 72 NG/ML
GLUCOSE SERPL-MCNC: 82 MG/DL
HBA1C MFR BLD HPLC: 4.9 %
HCT VFR BLD CALC: 39.6 %
HDLC SERPL-MCNC: 78 MG/DL
HGB BLD-MCNC: 12.5 G/DL
IMM GRANULOCYTES NFR BLD AUTO: 0.1 %
IRON SATN MFR SERPL: 38 %
IRON SERPL-MCNC: 120 UG/DL
LDLC SERPL CALC-MCNC: 81 MG/DL
LYMPHOCYTES # BLD AUTO: 2.99 K/UL
LYMPHOCYTES NFR BLD AUTO: 37.8 %
MAN DIFF?: NORMAL
MCHC RBC-ENTMCNC: 28.6 PG
MCHC RBC-ENTMCNC: 31.6 GM/DL
MCV RBC AUTO: 90.6 FL
MONOCYTES # BLD AUTO: 0.47 K/UL
MONOCYTES NFR BLD AUTO: 5.9 %
NEUTROPHILS # BLD AUTO: 4.32 K/UL
NEUTROPHILS NFR BLD AUTO: 54.8 %
NONHDLC SERPL-MCNC: 90 MG/DL
PLATELET # BLD AUTO: 318 K/UL
POTASSIUM SERPL-SCNC: 4 MMOL/L
PROT SERPL-MCNC: 7.8 G/DL
RBC # BLD: 4.37 M/UL
RBC # FLD: 13.1 %
SODIUM SERPL-SCNC: 139 MMOL/L
T PALLIDUM AB SER QL IA: NEGATIVE
TIBC SERPL-MCNC: 317 UG/DL
TRIGL SERPL-MCNC: 45 MG/DL
UIBC SERPL-MCNC: 197 UG/DL
WBC # FLD AUTO: 7.9 K/UL

## 2023-01-19 ENCOUNTER — TRANSCRIPTION ENCOUNTER (OUTPATIENT)
Age: 28
End: 2023-01-19

## 2023-01-19 LAB
C TRACH RRNA SPEC QL NAA+PROBE: NOT DETECTED
HBV SURFACE AB SER QL: REACTIVE
HBV SURFACE AG SER QL: NONREACTIVE
HCV AB SER QL: NONREACTIVE
HCV S/CO RATIO: 0.13 S/CO
N GONORRHOEA RRNA SPEC QL NAA+PROBE: NOT DETECTED
SOURCE AMPLIFICATION: NORMAL

## 2023-01-20 ENCOUNTER — HOSPITAL ENCOUNTER (OUTPATIENT)
Dept: HOSPITAL 74 - FASU-ENDO | Age: 28
Discharge: HOME | End: 2023-01-20
Attending: INTERNAL MEDICINE
Payer: COMMERCIAL

## 2023-01-20 VITALS — TEMPERATURE: 97.7 F

## 2023-01-20 VITALS — RESPIRATION RATE: 16 BRPM | DIASTOLIC BLOOD PRESSURE: 76 MMHG | SYSTOLIC BLOOD PRESSURE: 121 MMHG | HEART RATE: 84 BPM

## 2023-01-20 VITALS — BODY MASS INDEX: 17.4 KG/M2

## 2023-01-20 DIAGNOSIS — K64.8: ICD-10-CM

## 2023-01-20 DIAGNOSIS — Z12.11: Primary | ICD-10-CM

## 2023-01-20 DIAGNOSIS — K92.1: ICD-10-CM

## 2023-01-20 DIAGNOSIS — K64.1: ICD-10-CM

## 2023-01-20 PROCEDURE — 0DJD8ZZ INSPECTION OF LOWER INTESTINAL TRACT, VIA NATURAL OR ARTIFICIAL OPENING ENDOSCOPIC: ICD-10-PCS | Performed by: INTERNAL MEDICINE

## 2023-03-02 ENCOUNTER — APPOINTMENT (OUTPATIENT)
Dept: INTERNAL MEDICINE | Facility: CLINIC | Age: 28
End: 2023-03-02
Payer: COMMERCIAL

## 2023-03-02 VITALS
BODY MASS INDEX: 18.5 KG/M2 | DIASTOLIC BLOOD PRESSURE: 80 MMHG | WEIGHT: 98 LBS | SYSTOLIC BLOOD PRESSURE: 115 MMHG | OXYGEN SATURATION: 99 % | HEIGHT: 61 IN | HEART RATE: 79 BPM

## 2023-03-02 DIAGNOSIS — N63.0 UNSPECIFIED LUMP IN UNSPECIFIED BREAST: ICD-10-CM

## 2023-03-02 DIAGNOSIS — K76.9 LIVER DISEASE, UNSPECIFIED: ICD-10-CM

## 2023-03-02 PROCEDURE — 99214 OFFICE O/P EST MOD 30 MIN: CPT | Mod: 25

## 2023-03-02 NOTE — HISTORY OF PRESENT ILLNESS
[FreeTextEntry1] : Pt here to FU on left breast mass. Was planning to come after menses but delayed this cycle. \par LMP January 20 range. Menses are usually q 4-5 weeks.

## 2023-03-02 NOTE — PHYSICAL EXAM
[No Acute Distress] : no acute distress [Normal Voice/Communication] : normal voice/communication [Normal Appearance] : normal in appearance [No Nipple Discharge] : no nipple discharge [No Axillary Lymphadenopathy] : no axillary lymphadenopathy [de-identified] : left upper outer quadrant measured 2.5 x 5 cm mobile mass, non tender

## 2023-03-10 ENCOUNTER — RESULT REVIEW (OUTPATIENT)
Age: 28
End: 2023-03-10

## 2024-12-31 ENCOUNTER — APPOINTMENT (OUTPATIENT)
Dept: FAMILY MEDICINE | Facility: CLINIC | Age: 29
End: 2024-12-31

## 2024-12-31 VITALS
HEART RATE: 89 BPM | TEMPERATURE: 97.7 F | BODY MASS INDEX: 19.26 KG/M2 | HEIGHT: 61 IN | SYSTOLIC BLOOD PRESSURE: 118 MMHG | WEIGHT: 102 LBS | DIASTOLIC BLOOD PRESSURE: 72 MMHG | RESPIRATION RATE: 18 BRPM | OXYGEN SATURATION: 97 %

## 2024-12-31 DIAGNOSIS — K30 FUNCTIONAL DYSPEPSIA: ICD-10-CM

## 2024-12-31 DIAGNOSIS — K62.5 HEMORRHAGE OF ANUS AND RECTUM: ICD-10-CM

## 2024-12-31 DIAGNOSIS — R53.83 OTHER FATIGUE: ICD-10-CM

## 2024-12-31 DIAGNOSIS — R73.9 HYPERGLYCEMIA, UNSPECIFIED: ICD-10-CM

## 2024-12-31 DIAGNOSIS — N94.2 VAGINISMUS: ICD-10-CM

## 2024-12-31 DIAGNOSIS — Z00.00 ENCOUNTER FOR GENERAL ADULT MEDICAL EXAMINATION W/OUT ABNORMAL FINDINGS: ICD-10-CM

## 2024-12-31 PROCEDURE — 36415 COLL VENOUS BLD VENIPUNCTURE: CPT

## 2024-12-31 PROCEDURE — 99205 OFFICE O/P NEW HI 60 MIN: CPT

## 2025-01-02 ENCOUNTER — TRANSCRIPTION ENCOUNTER (OUTPATIENT)
Age: 30
End: 2025-01-02

## 2025-01-02 LAB
ESTIMATED AVERAGE GLUCOSE: 91 MG/DL
ESTRADIOL SERPL-MCNC: 124 PG/ML
FSH SERPL-MCNC: 5.1 IU/L
HBA1C MFR BLD HPLC: 4.8 %
LH SERPL-ACNC: 31.6 IU/L
PROLACTIN SERPL-MCNC: 14.8 NG/ML
TSH SERPL-ACNC: 1.73 UIU/ML